# Patient Record
Sex: MALE | Employment: UNEMPLOYED | URBAN - METROPOLITAN AREA
[De-identification: names, ages, dates, MRNs, and addresses within clinical notes are randomized per-mention and may not be internally consistent; named-entity substitution may affect disease eponyms.]

---

## 2021-03-25 ENCOUNTER — HOSPITAL ENCOUNTER (EMERGENCY)
Facility: CLINIC | Age: 31
Discharge: HOME | End: 2021-03-25
Attending: EMERGENCY MEDICINE | Admitting: EMERGENCY MEDICINE

## 2021-03-25 VITALS
OXYGEN SATURATION: 99 % | RESPIRATION RATE: 18 BRPM | HEART RATE: 66 BPM | TEMPERATURE: 98.7 F | DIASTOLIC BLOOD PRESSURE: 66 MMHG | SYSTOLIC BLOOD PRESSURE: 123 MMHG

## 2021-03-25 DIAGNOSIS — Z72.89 DELIBERATE SELF-CUTTING: ICD-10-CM

## 2021-03-25 PROCEDURE — 99283 EMERGENCY DEPT VISIT LOW MDM: CPT

## 2021-03-25 ASSESSMENT — ENCOUNTER SYMPTOMS: WOUND: 1

## 2021-03-25 NOTE — ED PROVIDER NOTES
"  History   Chief Complaint:  Laceration and Psychiatric Evaluation       The history is provided by the patient.      Nicholas Georges Jr is a 30 year old male who presents via EMS for psychiatric evaluation. The patient reports that he got into an argument with his fiancee this morning as she was accusing him of being \"low/bi\". He was preparing breakfast at the time and states that he unintentionally cut his left arm on a clean kitchen knife. Both lacerations were washed and dressed by medics on scene prior to arrival. He denies any intent of self harm or suicidal ideation. He denies issues with depression or anxiety recently or prior suicide attempts. The patient does state that he has been having occasional trouble staying erect recently, and thinks that this is why his fiancee thinks he is low. He is concerned that there is something wrong with his prostate. He denies any drug or alcohol use today. The patient declined formal mental health evaluation as he did not think it was necessary.    Review of Systems   Skin: Positive for wound (2 lacerations to L arm).   Psychiatric/Behavioral: Negative for self-injury (unintentional) and suicidal ideas.   All other systems reviewed and are negative.    Allergies:  No Known Drug Allergies    Medications:  The patient denies taking any regular medications.    Past Medical History:    The patient denies any past medial history.    Social History:  The patient was accompanied to the ED by EMS.  PCP: Mey Callejas  Occupation:   Marital status: Single  Alcohol Use: Not used today  Drug Use: Not used today    Physical Exam     Patient Vitals for the past 24 hrs:   BP Temp Temp src Pulse Resp SpO2   03/25/21 0952 -- 98.7  F (37.1  C) Oral -- -- --   03/25/21 0909 123/66 -- -- 66 18 99 %       Physical Exam  VS: Reviewed per above  HENT: normal speech  EYES: sclera anicteric  CV: Rate as noted, regular rhythm.  Intact left radial pulse.  RESP: Effort normal. " Breath sounds are normal bilaterally.  NEURO: Alert, moving all extremities with equal strength, sensation intact to light touch in the distal left upper extremity.  PSYCH: good eye contact, denies SI or depression.  MSK: No deformity of the extremities, left hand with intact flexor and extensor tendons function.  SKIN: Warm and dry, 3 superficial horizontal laceration to left distal volar forearm    Emergency Department Course     Emergency Department Course:    Reviewed:  I reviewed the patient's nursing notes, vitals and past medical history.     Assessments:  0917 I performed an exam of the patient in room 17 as documented above.    Disposition:  The patient was discharged to home.     Impression & Plan     Medical Decision Making:  Nicholas Georges Jr is a 30 year old male who presents with self-injurious behavior while in an argument.  On arrival vital signs are reassuring.  On exam patient has superficial lacerations of the left forearm.  None are deep enough to require sutures.  No evidence of neurovascular compromise or tendon injury to the left upper extremity.  Patient said that his tetanus vaccine is up-to-date within the past 5 to 10 years and that knife was clean.  Wounds were covered with antibiotic ointment and dressing.  Discussed signs of infection and reasons to return to the ER.  From psychiatric respective, patient injured his arm in a state of anger but does not feel like he wants to end his life or hurt himself.  At this time patient he did not wish to speak with mental health .  No evidence at this time the patient requires inpatient psychiatric stabilization.  Discussed with patient return to the ER for any new thoughts of self-harm.  Finally patient did mention some concerns about erectile dysfunction.  I referred him to urology for further assessment.    Diagnosis:    ICD-10-CM    1. Deliberate self-cutting  Z72.89        Discharge Medications:   None.      Scribe Disclosure:  I,  Magdalena Churchill am serving as a scribe at 9:17 AM on 3/25/2021 to document services personally performed by Zaki Schumacher MD based on my observations and the provider's statements to me.     This note was completed in part using Dragon voice recognition software. Although reviewed after completion, some word and grammatical errors may occur.     Magdalena Churchill  3/25/2021   [facility] EMERGENCY DEPARTMENT        Zaki Schumacher MD  03/25/21 1010

## 2021-03-25 NOTE — ED NOTES
Bed: ED17  Expected date:   Expected time:   Means of arrival:   [place]  [facility] [place] 30 [place] eta 0967

## 2021-03-25 NOTE — DISCHARGE INSTRUCTIONS
Discharge Instructions  Laceration (Cut)    You were seen today for a laceration (cut).  Your provider examined your laceration for any problems such a buried foreign body (like glass, a splinter, or gravel), or injury to blood vessels, tendons, and nerves.  Your provider may have also rinsed and/or scrubbed your laceration to help prevent an infection. It may not be possible to find all problems with your laceration on the first visit; occasionally foreign bodies or a tendon injury can go undetected.    Your laceration may have been closed in one of several ways:  No closure: many wounds will heal just fine without closure.  Stitches: regular stitches that require removal.  Staples: skin staples are often used in the scalp/head.  Wound adhesive (glue): skin glue can be used for certain lacerations and doesn t require removal.  Wound strips (aka Butterfly bandages or steri-strips): these are bandages that help to close a wound.  Absorbable stitches:  dissolving  stitches that go away on their own and usually don t require removal.    A small percentage of wounds will develop an infection regardless of how well the wound is cared for. Antibiotics are generally not indicated to prevent an infection so are only given for a small number of high-risk wounds. Some lacerations are too high risk to close, and are left open to heal because closure can increase the likelihood that an infection will develop.    Remember that all lacerations, no matter how expertly repaired, will cause scarring. We consider many factors, techniques, and materials, in our efforts to provide the best possible cosmetic outcome.    Generally, every Emergency Department visit should have a follow-up clinic visit with either a primary or a specialty clinic/provider. Please follow-up as instructed by your emergency provider today.     Return to the Emergency Department right away if:  You have more redness, swelling, pain, drainage (pus), a bad smell,  or red streaking from your laceration as these symptoms could indicate an infection.  You have a fever of 100.4 F or more.  You have bleeding that you cannot stop at home. If your cut starts to bleed, hold pressure on the bleeding area with a clean cloth or put pressure over the bandage.  If the bleeding does not stop after using constant pressure for 30 minutes, you should return to the Emergency Department for further treatment.  An area past the laceration is cool, pale, or blue compared with the other side, or has a slower return of color when squeezed.  Your dressing seems too tight or starts to get uncomfortable or painful. For children, signs of a problem might be irritability or restlessness.  You have loss of normal function or use of an area, such as being unable to straighten or bend a finger normally.  You have a numb area past the laceration.    Return to the Emergency Department or see your regular provider if:  The laceration starts to come open.   You have something coming out of the cut or a feeling that there is something in the laceration.  Your wound will not heal, or keeps breaking open. There can always be glass, wood, dirt or other things in any wound.  They will not always show up, even on x-rays.  If a wound does not heal, this may be why, and it is important to follow-up with your regular provider.    Home Care:  Take your dressing off in 12-24 hours, or as instructed by your provider, to check your laceration. Remove the dressing sooner if it seems too tight or painful, or if it is getting numb, tingly, or pale past the dressing.  Gently wash your laceration 1-2 times daily with clean water and mild soap. It is okay to shower or run clean water over the laceration, but do not let the laceration soak in water (no swimming).  If your laceration was closed with wound adhesive or strips: pat it dry and leave it open to the air. For all other repairs: after you wash your laceration, or at least  2 times a day, apply antibiotic ointment (such as Neosporin  or Bacitracin ) to the laceration, then cover it with a Band-Aid  or gauze.  Keep the laceration clean. Wear gloves or other protective clothing if you are around dirt.    Follow-up for removal:  If your wound was closed with staples or regular stitches, they need to be removed according to the instructions and timeline specified by your provider today.  If your wound was closed with absorbable ( dissolving ) sutures, they should fall out, dissolve, or not be visible in about one week. If they are still visible, then they should be removed according to the instructions and timeline specified by your provider today.    Scars:  To help minimize scarring:  Wear sunscreen over the healed laceration when out in the sun.  Massage the area regularly once healed.  You may apply Vitamin E to the healed wound.  Wait. Scars improve in appearance over months and years.    If you were given a prescription for medicine here today, be sure to read all of the information (including the package insert) that comes with your prescription.  This will include important information about the medicine, its side effects, and any warnings that you need to know about.  The pharmacist who fills the prescription can provide more information and answer questions you may have about the medicine.  If you have questions or concerns that the pharmacist cannot address, please call or return to the Emergency Department.       Remember that you can always come back to the Emergency Department if you are not able to see your regular provider in the amount of time listed above, if you get any new symptoms, or if there is anything that worries you.    Discharge Instructions  Mental Health Concerns    You were seen today for mental health concerns, such as depression, anxiety, or suicidal thinking. Your provider feels that you do not require hospitalization at this time. However, your symptoms  may become worse, and you may need to return to the Emergency Department. Most treatments of depression and suicidal thoughts are a process rather than a single intervention.  Medications and counseling can take several weeks or more to help.    Generally, every Emergency Department visit should have a follow-up clinic visit with either a primary or a specialty clinic/provider. Please follow-up as instructed by your emergency provider today.    By accepting these discharge instructions:  You promise to not harm yourself or others.  You agree that if you feel you are becoming unable to keep that promise, you will do something to help yourself before you do anything to harm yourself or others.   You agree to keep any safety plan arranged on your visit here today.  You agree to take any medication prescribed or recommended by your provider.  If you are getting worse, you can contact a friend or a family member, contact your counselor or family provider, contact a crisis line, or other options discussed with the provider or therapist today.  At any time, you can call 911 and return to the Emergency Department for more help.  You understand that follow-up is essential to your treatment, and you will make and keep appointments recommended on your visit today.    How to improve your mental health and prevent suicide:  Involve others by letting family, friends, counselors know.  Do not isolate yourself.  Avoid alcohol or drugs. Remove weapons, poisons from your home.  Try to stick to routines for eating, sleeping and getting regular exercise.    Try to get into sunlight. Bright natural light not only treats seasonal affective disorder but also depression.  Increase safe activities that you enjoy.    If you feel worse, contact [phone] ([phone]), or call 911, or your primary provider/counselor for additional assistance.    If you were given a prescription for medicine here today, be sure to read all of the  information (including the package insert) that comes with your prescription.  This will include important information about the medicine, its side effects, and any warnings that you need to know about.  The pharmacist who fills the prescription can provide more information and answer questions you may have about the medicine.  If you have questions or concerns that the pharmacist cannot address, please call or return to the Emergency Department.   Remember that you can always come back to the Emergency Department if you are not able to see your regular provider in the amount of time listed above, if you get any new symptoms, or if there is anything that worries you.